# Patient Record
Sex: FEMALE | Race: WHITE | NOT HISPANIC OR LATINO | URBAN - METROPOLITAN AREA
[De-identification: names, ages, dates, MRNs, and addresses within clinical notes are randomized per-mention and may not be internally consistent; named-entity substitution may affect disease eponyms.]

---

## 2019-05-15 ENCOUNTER — HOSPITAL ENCOUNTER (OUTPATIENT)
Dept: PHYSICAL THERAPY | Facility: CLINIC | Age: 39
Setting detail: RECURRING SERIES
Discharge: HOME | End: 2019-07-10
Attending: ANESTHESIOLOGY

## 2022-01-13 ENCOUNTER — TRANSCRIBE ORDERS (OUTPATIENT)
Dept: ADMINISTRATIVE | Facility: HOSPITAL | Age: 42
End: 2022-01-13

## 2022-01-13 ENCOUNTER — HOSPITAL ENCOUNTER (OUTPATIENT)
Dept: GENERAL RADIOLOGY | Facility: HOSPITAL | Age: 42
Discharge: HOME | End: 2022-01-13
Admitting: NURSE PRACTITIONER

## 2022-01-13 DIAGNOSIS — Z12.31 SCREENING MAMMOGRAM FOR HIGH-RISK PATIENT: Primary | ICD-10-CM

## 2022-01-13 DIAGNOSIS — N92.0 EXCESSIVE OR FREQUENT MENSTRUATION: Primary | ICD-10-CM

## 2022-01-13 DIAGNOSIS — R06.00 DYSPNEA, UNSPECIFIED TYPE: ICD-10-CM

## 2022-01-13 PROCEDURE — 71046 X-RAY EXAM CHEST 2 VIEWS: CPT

## 2022-01-17 ENCOUNTER — APPOINTMENT (OUTPATIENT)
Dept: ULTRASOUND IMAGING | Facility: HOSPITAL | Age: 42
End: 2022-01-17

## 2022-01-21 ENCOUNTER — APPOINTMENT (OUTPATIENT)
Dept: ULTRASOUND IMAGING | Facility: HOSPITAL | Age: 42
End: 2022-01-21

## 2022-01-24 ENCOUNTER — APPOINTMENT (OUTPATIENT)
Dept: ULTRASOUND IMAGING | Facility: HOSPITAL | Age: 42
End: 2022-01-24

## 2022-01-31 ENCOUNTER — APPOINTMENT (OUTPATIENT)
Dept: ULTRASOUND IMAGING | Facility: HOSPITAL | Age: 42
End: 2022-01-31

## 2022-02-08 ENCOUNTER — HOSPITAL ENCOUNTER (OUTPATIENT)
Dept: ULTRASOUND IMAGING | Facility: HOSPITAL | Age: 42
Discharge: HOME | End: 2022-02-08
Admitting: NURSE PRACTITIONER

## 2022-02-08 DIAGNOSIS — N92.0 EXCESSIVE OR FREQUENT MENSTRUATION: ICD-10-CM

## 2022-02-08 PROCEDURE — 76830 TRANSVAGINAL US NON-OB: CPT

## 2022-03-24 ENCOUNTER — HOSPITAL ENCOUNTER (OUTPATIENT)
Dept: MAMMOGRAPHY | Facility: HOSPITAL | Age: 42
Discharge: HOME | End: 2022-03-24
Admitting: NURSE PRACTITIONER

## 2022-03-24 DIAGNOSIS — Z12.31 SCREENING MAMMOGRAM FOR HIGH-RISK PATIENT: ICD-10-CM

## 2022-03-24 PROCEDURE — 77063 BREAST TOMOSYNTHESIS BI: CPT

## 2022-03-24 PROCEDURE — 77067 SCR MAMMO BI INCL CAD: CPT

## 2022-05-23 ENCOUNTER — OFFICE VISIT (OUTPATIENT)
Dept: SLEEP MEDICINE | Facility: HOSPITAL | Age: 42
End: 2022-05-23

## 2022-05-23 VITALS
BODY MASS INDEX: 34.06 KG/M2 | DIASTOLIC BLOOD PRESSURE: 76 MMHG | SYSTOLIC BLOOD PRESSURE: 121 MMHG | HEIGHT: 67 IN | OXYGEN SATURATION: 99 % | HEART RATE: 53 BPM | WEIGHT: 217 LBS

## 2022-05-23 DIAGNOSIS — F51.3 SLEEP WALKINGS: ICD-10-CM

## 2022-05-23 DIAGNOSIS — G47.10 HYPERSOMNIA: ICD-10-CM

## 2022-05-23 DIAGNOSIS — R06.83 SNORING: ICD-10-CM

## 2022-05-23 DIAGNOSIS — F51.04 PSYCHOPHYSIOLOGICAL INSOMNIA: ICD-10-CM

## 2022-05-23 DIAGNOSIS — G47.30 OBSERVED SLEEP APNEA: Primary | ICD-10-CM

## 2022-05-23 PROCEDURE — 99244 OFF/OP CNSLTJ NEW/EST MOD 40: CPT | Performed by: INTERNAL MEDICINE

## 2022-05-23 PROCEDURE — G0463 HOSPITAL OUTPT CLINIC VISIT: HCPCS | Performed by: INTERNAL MEDICINE

## 2022-05-23 RX ORDER — CITALOPRAM 40 MG/1
40 TABLET ORAL DAILY
COMMUNITY

## 2022-05-23 RX ORDER — TIOTROPIUM BROMIDE AND OLODATEROL 3.124; 2.736 UG/1; UG/1
SPRAY, METERED RESPIRATORY (INHALATION)
COMMUNITY
Start: 2022-04-19

## 2022-05-23 RX ORDER — TRAZODONE HYDROCHLORIDE 50 MG/1
50 TABLET ORAL
COMMUNITY
Start: 2022-05-05

## 2022-05-23 RX ORDER — BUSPIRONE HYDROCHLORIDE 10 MG/1
TABLET ORAL
COMMUNITY
Start: 2022-05-05

## 2022-05-23 NOTE — PROGRESS NOTES
[facility] [address]  [place]   [place] [address]  Phone: [phone]  Fax: [phone]      Luis Enrique Salter  [id number]   1980  42 y.o.  female      Referring physician/provider and PCP Maru Masters APRN    Type of service: Initial Sleep Medicine Consult.  Date of service: 5/23/2022      Chief Complaint   Patient presents with   • Witnessed Apnea   • Snoring   • Fatigue   • Non-restorative Sleep   • Dry Mouth   • Daytime Sleepiness   • Obesity       History of present illness;  Thank you for asking to see Luis Enrique Salter, 42 y.o.. The patient was seen today on 5/23/2022 at [facility].  The patient presents today with symptoms of snoring, non-restorative sleep and witnessed apneas. The symptoms are present for many years and they are persistent in nature.  The snoring is present in all positions and it is loud.  Has no history of prior sleep evaluation and sleep studies. Patient has no prior surgery namely tonsillectomy, nasal surgery and UPPP.     In addition she has insomnia wakes up several times.  She has been having sleepwalking since her childhood and still does.    Patient gives the following sleep history.  Sleep schedule:  Bedtime: 11 PM  Wake time: 7 AM  Normally takes about 30-60 minutes to fall asleep  Average hours of sleep 5  Number of naps per day no  Symptoms   In addition to snoring, nonrestorative sleep and witnessed apneas patient gives the following associated symptoms.  Have you ever awakened gasping for breath, coughing, choking: Yes   Change in weight,  Yes lost 20 pounds  Morning headaches  Yes   Awaken with a sore throat or dry mouth  Yes   Leg jerking at night:  Yes   Crawly feeling/urge sensation to move in the legs: Yes   Teeth grinding:No   Have you ever awakened at night with a sour taste or burning sensation in your chest:  Yes   Do you have muscle weakness with laughing or anger or sleep paralysis:  No   Have you ever felt  "paralyzed while going to sleep or waking up:  No   Sleepwalking, nightmares, Yes   Nocturia (urination at night): 3 times per night  Memory Problem:Yes     MEDICAL CONDITIONS (PMH)  1. ADHD  2. Depression  3. Acid reflux  4. Asthma    Social history:  Do you drive a commercial vehicle:  No   Shift work:  No   Tobacco use:  Yes   Alcohol use: 0 per week  Caffeinated drinks: 3  Occupation: She works in a store     Family Hx (your parents and siblings)  1. Sleep apnea, brother    Medications: reviewed    Review of systems:  Sleep: Positve for snoring,witnessed apnea and daytime excessive sleepiness with Trafford Sleepiness Scale of Total score: 0   Kidney/ Bladder  Difficulty In Urination: negative  Bed Wetting: negative  Frequent Urination: positive  HEENT  Recurrent Nose Bleeds: negative  Ear pain: positive  Sores In Mouth: negative  Persistent Hoarseness: negative  Nasal Congestion: negative  Post Nasal Drip: negative  Musculoskeletal:  Neck Pain: positive  Temporomandibular Joint Pain: negative  General:  Fever: negative  Fatigue: positive  Cardiovascular:  Irregular Heartbeat: negative  Swollen Ankles Or Legs: negative  Respiratory:  Shortness Of Breath: positive  Wheezing: negative  Neuro/Paych:  Fainting Spells: negative  Dizziness: positive  Anxiety: positive  Depression: positive  Gastrointestinal:  Problem Swallowing: positive  Frequent Heartburn: positive  Abdominal Bloating: negative  Skin:  Rash: negative  Change In Nails: negative  Endocrine:  Excessive Thirst: negative  Always Too Cold: negative  Always Too Warm: negative  Hem/Lymphatic:  Swollen Glands: negative  Burses/ Bleeds Easily: positive    Physical exam:  CONSTITUTINONAL:  Vitals:    05/23/22 1300   BP: 121/76   Pulse: 53   SpO2: 99%   Weight: 98.4 kg (217 lb)   Height: 170.2 cm (67\")    Body mass index is 33.99 kg/m².   HEAD: atraumatic, normocephalic   EYES:pupils are round, equal and reacting to light and accommodation, conjunctiva " normal  NOSE:no nasal septal defects, nasal passages are clear, no nasal polyps,   THROAT: tonsils are not enlarged, tongue normal size, oral airway Mallampati class 3  NECK: , trachea is in the midline, thyroid not enlarged  RESPIRATORY SYSTEM: Breath sounds are normal, there are no wheezes  CARDIOVASULAR SYSTEM: Heart sounds are regular rhythm and normal rate, there are no murmurs or thrills, no edema  GASTROINTESTINAL: Soft and non-tender,liver not enlarged, no evidence of ascites  MUSCULOSKELETAL SYSTEM: Full range of motion's in all the 4 extremities, neck movement not restricted, temporomandibular joint movement normal and no tenderness  SKIN: Warm and moist, no cyanosis, no clubbing,  NEUROLOGICAL SYSTEM: Oriented x 3, no gross neurological defects, No speech defect, gait is normal  PSYCHIATRIC SYSTEM: Mood is normal, thought content is normal        Assessment and plan:  · Witnessed apneas,(R06.81) patient's symptoms and examination is consistent with sleep apnea (G47.30). I have talked to the patient about the signs and symptoms of sleep apnea. In addition, I have also discussed pathophysiology of sleep apnea.  I also discussed the complications of untreated sleep apnea including effects on hypertension, diabetes mellitus and nonrestorative sleep with hypersomnia which can increase risk for motor vehicle accidents.  Untreated sleep apnea is also a risk factor for development of atrial fibrillation, pulmonary hypertension and stroke.  Discussed in detail of various testing methods including home-based and lab based sleep studies.  Based on history and physical examination and other comorbidities the most appropriate study is home sleep test.  The order for the sleep study is placed in [facility].  The test will be scheduled after approval from insurance. Treatment and management will be discussed after the test is completed.  Patient was given opportunity to ask questions and all the questions were answered.    · Snoring (R06.83), snoring is the sound created by turbulent airflow vibrating upper airway soft tissue due to limitation of inspiratory airflow. I have also discussed factors affecting snoring including sleep deprivation, sleeping on the back and alcohol ingestion. To minimize snoring, patient is advised to have adequate sleep, sleep on the side and avoid alcohol and sedative medications before bedtime  · Obesity 1, patient's BMI is Body mass index is 33.99 kg/m².. I have discussed the relationship between weight and sleep apnea.There is direct correlation between weight and severity of sleep apnea.  Weight reduction is encouraged, as it is going to reduce the severity of sleep apnea. I have also discussed with the patient diet and exercise to achieve ideal body weight.  · Psychophysiological insomnia,    · Sleepwalking and sleep talking, talk to the patient about safety precautions he has to take.    I have also discussed with the patient the following  • Sleep hygiene: Maintaining a regular bedtime and wake time, not to watch television or work in bed, limit caffeine-containing beverages before bed time and avoid naps during the day  • Adequate amount of sleep.  Generally most people needs about 7 to 8 hours of sleep.    Return for 31 to 90 days after PAP setup with down load..  Patient's questions were answered      I once again thank you for asking me to see this patient in consultation and I have forwarded my opinion and treatment plan.  Please do not hesitate to call me if you have any questions.     Robert Mauricio MD  Sleep Medicine  Medical Director, [facility] and [facility]  5/23/2022 ,

## 2022-06-16 ENCOUNTER — HOSPITAL ENCOUNTER (OUTPATIENT)
Dept: SLEEP MEDICINE | Facility: HOSPITAL | Age: 42
Discharge: HOME | End: 2022-06-16
Admitting: INTERNAL MEDICINE

## 2022-06-16 DIAGNOSIS — F51.04 PSYCHOPHYSIOLOGICAL INSOMNIA: ICD-10-CM

## 2022-06-16 DIAGNOSIS — G47.10 HYPERSOMNIA: ICD-10-CM

## 2022-06-16 DIAGNOSIS — R06.83 SNORING: ICD-10-CM

## 2022-06-16 DIAGNOSIS — F51.3 SLEEP WALKINGS: ICD-10-CM

## 2022-06-16 DIAGNOSIS — G47.30 OBSERVED SLEEP APNEA: ICD-10-CM

## 2022-06-16 PROCEDURE — 95806 SLEEP STUDY UNATT&RESP EFFT: CPT | Performed by: INTERNAL MEDICINE

## 2022-06-16 PROCEDURE — 95806 SLEEP STUDY UNATT&RESP EFFT: CPT

## 2022-06-21 ENCOUNTER — TELEPHONE (OUTPATIENT)
Dept: SLEEP MEDICINE | Facility: HOSPITAL | Age: 42
End: 2022-06-21

## 2022-07-06 ENCOUNTER — OFFICE VISIT (OUTPATIENT)
Dept: SLEEP MEDICINE | Facility: HOSPITAL | Age: 42
End: 2022-07-06

## 2022-07-06 VITALS
SYSTOLIC BLOOD PRESSURE: 122 MMHG | HEIGHT: 67 IN | HEART RATE: 63 BPM | WEIGHT: 205 LBS | DIASTOLIC BLOOD PRESSURE: 65 MMHG | BODY MASS INDEX: 32.18 KG/M2 | OXYGEN SATURATION: 97 %

## 2022-07-06 DIAGNOSIS — G47.8 UARS (UPPER AIRWAY RESISTANCE SYNDROME): Primary | ICD-10-CM

## 2022-07-06 DIAGNOSIS — R06.83 SNORING: ICD-10-CM

## 2022-07-06 PROBLEM — G47.10 HYPERSOMNIA: Status: RESOLVED | Noted: 2022-05-23 | Resolved: 2022-07-06

## 2022-07-06 PROBLEM — G47.30 OBSERVED SLEEP APNEA: Status: RESOLVED | Noted: 2022-05-23 | Resolved: 2022-07-06

## 2022-07-06 PROCEDURE — 99212 OFFICE O/P EST SF 10 MIN: CPT | Performed by: INTERNAL MEDICINE

## 2022-07-06 PROCEDURE — G0463 HOSPITAL OUTPT CLINIC VISIT: HCPCS | Performed by: INTERNAL MEDICINE

## 2022-07-06 NOTE — PROGRESS NOTES
"  [address] [address]  [place]   [place] [address]  Phone: [phone]  Fax: [phone]      SLEEP CLINIC FOLLOW UP PROGRESS NOTE.    Luis Enrique Salter  [id number]   1980  42 y.o.  female      PCP: Maru Masters APRN      Date of visit: 7/6/2022    Chief Complaint   Patient presents with   • Snoring   • Obesity       HPI:  This is a 42 y.o. years old patient who has a history of snoring and other symptoms of sleep apnea was evaluated recently and underwent sleep test.  Patient is here to discuss the test results and treatment options.  I have discussed with the patient the test results.  To summarize the test shows no evidence of sleep apnea.  The AHI(apnea-hypopnea index) is 1.7 /hr, less than 5.0 is normal.  But the test showed snoring for 75% of sleep time with 180 episodes.  No evidence of oxygen desaturation.     Medication and allergies are reviewed by me and documented in the encounter.     SOCIAL ( habits pertaining to sleep medicine)  History of smoking:Yes   History of alcohol use: 0 per week  Caffeine use: 2     REVIEW OF SYSTEMS:   K[place]ak Sleepiness Scale :Total score: 0   Snoring: Yes   Nasal congestion:No       PHYSICAL EXAMINATION:  CONSTITUTIONAL:  Vitals:    07/06/22 0900   BP: 122/65   Pulse: 63   SpO2: 97%   Weight: 93 kg (205 lb)   Height: 170.2 cm (67\")    Body mass index is 32.11 kg/m².  NOSE: nasal passages are clear, no nasal polyps, septum in the midline.  THROAT: throat is clear,       ASSESSMENT AND PLAN:  · Snoring R 06.83 /upper airway resistance syndrome G 47.39  · Patient is advised to lose weight, Body mass index is 32.11 kg/m².  · Advised to sleep on the side, patient can use body pillow to prevent rollover  · Sleep hygiene, with the regular sleep time and wake time  · Advised to avoid alcohol and other sedative medications  · Patient can also use oral appliance.  I have given information about the oral appliance.  · Obesity, class 1 with BMI " is Body mass index is 32.11 kg/m².. I have discuss the relationship between the weight and sleep apnea. The benefit of weight loss in reducing severity of sleep apnea was discussed. Discussed diet and exercise with the patient to achieve ideal BMI.  · Return if symptoms worsen or fail to improve. . Patient's questions were answered.        Robert Mauricio MD  Sleep Medicine  Medical Director for [facility]  7/6/2022

## 2024-01-15 ENCOUNTER — TRANSCRIBE ORDERS (OUTPATIENT)
Dept: ADMINISTRATIVE | Facility: HOSPITAL | Age: 44
End: 2024-01-15
Payer: COMMERCIAL

## 2024-01-15 DIAGNOSIS — Z12.31 VISIT FOR SCREENING MAMMOGRAM: Primary | ICD-10-CM

## 2024-01-17 ENCOUNTER — TELEPHONE (OUTPATIENT)
Dept: GASTROENTEROLOGY | Facility: CLINIC | Age: 44
End: 2024-01-17
Payer: COMMERCIAL

## 2024-01-17 NOTE — TELEPHONE ENCOUNTER
LEFT VOICE MESSAGE FOR PATIENT TO RETURN CALL IN REGARDS TO A REFERRAL WE RECEIVED FROM [facility] FOR A COLON SCREENING. PATIENT NEEDS A PHONE SCREENING APPOINTMENT.

## 2024-01-22 NOTE — TELEPHONE ENCOUNTER
"Attempted to contact the patient in regards to the referral we received from She[facility] for \"Encounter for screening for malignant neoplasm of colon\". The call rang a few times and was answered then immediately hung up. Deferring out a day to give the patient time to call the office back before we send out a letter.   "